# Patient Record
Sex: FEMALE | Race: WHITE | Employment: UNEMPLOYED | URBAN - METROPOLITAN AREA
[De-identification: names, ages, dates, MRNs, and addresses within clinical notes are randomized per-mention and may not be internally consistent; named-entity substitution may affect disease eponyms.]

---

## 2022-02-22 ENCOUNTER — HOSPITAL ENCOUNTER (EMERGENCY)
Age: 22
Discharge: HOME | End: 2022-02-22
Payer: COMMERCIAL

## 2022-02-22 ENCOUNTER — APPOINTMENT (OUTPATIENT)
Dept: GENERAL RADIOLOGY | Age: 22
End: 2022-02-22
Payer: COMMERCIAL

## 2022-02-22 VITALS
RESPIRATION RATE: 14 BRPM | BODY MASS INDEX: 17.58 KG/M2 | WEIGHT: 112 LBS | HEIGHT: 67 IN | OXYGEN SATURATION: 98 % | SYSTOLIC BLOOD PRESSURE: 103 MMHG | TEMPERATURE: 97.8 F | HEART RATE: 85 BPM | DIASTOLIC BLOOD PRESSURE: 69 MMHG

## 2022-02-22 DIAGNOSIS — S63.501A SPRAIN OF RIGHT WRIST, INITIAL ENCOUNTER: Primary | ICD-10-CM

## 2022-02-22 PROCEDURE — 99284 EMERGENCY DEPT VISIT MOD MDM: CPT

## 2022-02-22 PROCEDURE — 73110 X-RAY EXAM OF WRIST: CPT

## 2022-02-22 RX ORDER — NAPROXEN 500 MG/1
500 TABLET ORAL 2 TIMES DAILY
Qty: 20 TABLET | Refills: 0 | Status: SHIPPED | OUTPATIENT
Start: 2022-02-22 | End: 2022-03-04

## 2022-02-22 ASSESSMENT — PAIN DESCRIPTION - PAIN TYPE: TYPE: ACUTE PAIN

## 2022-02-22 ASSESSMENT — ENCOUNTER SYMPTOMS
COUGH: 0
SHORTNESS OF BREATH: 0
BACK PAIN: 0
ABDOMINAL PAIN: 0

## 2022-02-22 ASSESSMENT — PAIN DESCRIPTION - ORIENTATION: ORIENTATION: RIGHT

## 2022-02-22 ASSESSMENT — PAIN - FUNCTIONAL ASSESSMENT: PAIN_FUNCTIONAL_ASSESSMENT: 0-10

## 2022-02-22 ASSESSMENT — PAIN SCALES - GENERAL: PAINLEVEL_OUTOF10: 2

## 2022-02-22 ASSESSMENT — PAIN DESCRIPTION - LOCATION: LOCATION: WRIST

## 2022-02-22 NOTE — ED PROVIDER NOTES
[facility] [facility] ED  eMERGENCY dEPARTMENT eNCOUnter      Pt Name: Verna Ball  MRN: [id number]  [place] 2000  Date of evaluation: 2/22/2022  Provider: RADHA Wolff CNP      HISTORY OF PRESENT ILLNESS    Verna Ball is a 24 y.o. female who presents to the Emergency Department with R wrist pain after slipping in the bathroom and hitting her wrist yesterday. Pain is mild. She denies any other injury. Pain is worse with movement. REVIEW OF SYSTEMS       Review of Systems   Constitutional: Negative for fever. HENT: Negative for congestion. Respiratory: Negative for cough and shortness of breath. Cardiovascular: Negative for chest pain. Gastrointestinal: Negative for abdominal pain. Genitourinary: Negative for dysuria. Musculoskeletal: Negative for arthralgias and back pain. R wrist pain   Skin: Negative for rash. All other systems reviewed and are negative. PAST MEDICAL HISTORY   History reviewed. No pertinent past medical history. SURGICAL HISTORY       Past Surgical History:   Procedure Laterality Date    KNEE SURGERY Left          CURRENT MEDICATIONS       Previous Medications    No medications on file       ALLERGIES     Patient has no known allergies. FAMILY HISTORY     History reviewed. No pertinent family history.        SOCIAL HISTORY       Social History     Socioeconomic History    Marital status: Single     Spouse name: None    Number of children: None    Years of education: None    Highest education level: None   Occupational History    None   Tobacco Use    Smoking status: Never Smoker    Smokeless tobacco: Never Used   Vaping Use    Vaping Use: Never used   Substance and Sexual Activity    Alcohol use: Not Currently    Drug use: Yes     Types: Marijuana Deboraha Sanefren)     Comment: medical pt    Sexual activity: None   Other Topics Concern    None   Social History Narrative    None     Social Determinants of Health     Financial Resource Strain:     Difficulty of Paying Living Expenses: Not on file   Food Insecurity:     Worried About Running Out of Food in the Last Year: Not on file    Neda of Food in the Last Year: Not on file   Transportation Needs:     Lack of Transportation (Medical): Not on file    Lack of Transportation (Non-Medical): Not on file   Physical Activity:     Days of Exercise per Week: Not on file    Minutes of Exercise per Session: Not on file   Stress:     Feeling of Stress : Not on file   Social Connections:     Frequency of Communication with Friends and Family: Not on file    Frequency of Social Gatherings with Friends and Family: Not on file    Attends Jain Services: Not on file    Active Member of [address] [place] [address] [employer] or Organizations: Not on file    Attends Club or Organization Meetings: Not on file    Marital Status: Not on file   Intimate Partner Violence:     Fear of Current or Ex-Partner: Not on file    Emotionally Abused: Not on file    Physically Abused: Not on file    Sexually Abused: Not on file   Housing Stability:     Unable to Pay for Housing in the Last Year: Not on file    Number of [place] in the Last Year: Not on file    Unstable Housing in the Last Year: Not on file       SCREENINGS    [place] Coma Scale  Eye Opening: Spontaneous  Best Verbal Response: Oriented  Best Motor Response: Obeys commands  [place] Coma Scale Score: 15 @FLOW([id number])@      PHYSICAL EXAM    (up to 7 for level 4, 8 or more for level 5)     ED Triage Vitals [02/22/22 1442]   BP Temp Temp src Pulse Resp SpO2 Height Weight   103/69 97.8 °F (36.6 °C) -- 85 14 98 % 5' 7\" (1.702 m) 112 lb (50.8 kg)       Physical Exam  Musculoskeletal:      Right wrist: Tenderness (increased pain with supination) present. No swelling, deformity or crepitus. Normal pulse. Hands: All other labs were within normal range or not returned as of this dictation.     EMERGENCY DEPARTMENT COURSE and DIFFERENTIALDIAGNOSIS/MDM:   Vitals:    Vitals:    02/22/22 1442   BP: 103/69   Pulse: 85   Resp: 14   Temp: 97.8 °F (36.6 °C)   SpO2: 98%   Weight: 112 lb (50.8 kg)   Height: 5' 7\" (1.702 m)            24 yr old female with R wrist sprain. Velcro wrist splint applied. F/U with ortho in 1 week if symptoms persist.  Patient was given a prescription for Naprosyn. Patient verbalizes understanding       PROCEDURES:  Unless otherwise noted below, none     Procedures      FINAL IMPRESSION      1.  Sprain of right wrist, initial encounter          DISPOSITION/PLAN   DISPOSITION Decision To Discharge 02/22/2022 03:33:41 PM          RADHA Cabrera CNP (electronically signed)  Attending Emergency Physician     RADHA Cabrera CNP  02/22/22 1533

## 2022-02-22 NOTE — ED TRIAGE NOTES
Pt to ED with c/o right lateral wrist pain after tripping and falling yesterday. Skin warm, dry, intact. MSP intact. Increased pain with wrist movement.

## 2023-06-03 LAB
CLUE CELLS: ABNORMAL
NUGENT SCORE: 4
VAGINITIS-BV + YEAST INTERPRETATION: ABNORMAL
YEAST: ABNORMAL

## 2023-06-05 LAB — URINE CULTURE: ABNORMAL

## 2024-03-21 ENCOUNTER — HOSPITAL ENCOUNTER (EMERGENCY)
Facility: HOSPITAL | Age: 24
Discharge: HOME | End: 2024-03-21
Payer: COMMERCIAL

## 2024-03-21 VITALS
DIASTOLIC BLOOD PRESSURE: 65 MMHG | TEMPERATURE: 98.8 F | SYSTOLIC BLOOD PRESSURE: 110 MMHG | BODY MASS INDEX: 17.42 KG/M2 | HEIGHT: 67 IN | RESPIRATION RATE: 18 BRPM | HEART RATE: 72 BPM | WEIGHT: 111 LBS | OXYGEN SATURATION: 100 %

## 2024-03-21 DIAGNOSIS — K13.79 ORAL PAIN: Primary | ICD-10-CM

## 2024-03-21 PROCEDURE — 99283 EMERGENCY DEPT VISIT LOW MDM: CPT | Performed by: PHYSICIAN ASSISTANT

## 2024-03-21 PROCEDURE — 99283 EMERGENCY DEPT VISIT LOW MDM: CPT

## 2024-03-21 RX ORDER — AMOXICILLIN AND CLAVULANATE POTASSIUM 875; 125 MG/1; MG/1
875 TABLET, FILM COATED ORAL EVERY 12 HOURS
Qty: 20 TABLET | Refills: 0 | Status: SHIPPED | OUTPATIENT
Start: 2024-03-21 | End: 2024-03-31

## 2024-03-21 ASSESSMENT — LIFESTYLE VARIABLES
EVER FELT BAD OR GUILTY ABOUT YOUR DRINKING: NO
EVER HAD A DRINK FIRST THING IN THE MORNING TO STEADY YOUR NERVES TO GET RID OF A HANGOVER: NO
HAVE YOU EVER FELT YOU SHOULD CUT DOWN ON YOUR DRINKING: NO
HAVE PEOPLE ANNOYED YOU BY CRITICIZING YOUR DRINKING: NO

## 2024-03-21 ASSESSMENT — PAIN - FUNCTIONAL ASSESSMENT: PAIN_FUNCTIONAL_ASSESSMENT: 0-10

## 2024-03-21 ASSESSMENT — PAIN SCALES - GENERAL: PAINLEVEL_OUTOF10: 6

## 2024-03-21 ASSESSMENT — COLUMBIA-SUICIDE SEVERITY RATING SCALE - C-SSRS
1. IN THE PAST MONTH, HAVE YOU WISHED YOU WERE DEAD OR WISHED YOU COULD GO TO SLEEP AND NOT WAKE UP?: NO
2. HAVE YOU ACTUALLY HAD ANY THOUGHTS OF KILLING YOURSELF?: NO
6. HAVE YOU EVER DONE ANYTHING, STARTED TO DO ANYTHING, OR PREPARED TO DO ANYTHING TO END YOUR LIFE?: NO

## 2024-03-21 NOTE — ED PROVIDER NOTES
HPI   Chief Complaint   Patient presents with    Dental Pain       This is a 23-year-old otherwise healthy female presenting for evaluation of right posterior buccal mucosa pain.  She thought maybe she bit her mouth while she was sleeping.  Has had symptoms since waking yesterday morning.  Pain is worse with chewing and swallowing on the right side.  Has not had her wisdom teeth removed.  Denies difficulty swallowing, difficulty breathing, wheezing, fevers, chills, cough, chest pain, shortness of breath, abdominal, nausea, vomiting, diarrhea.  Is currently breast-feeding.      History provided by:  Patient   used: No                        [place] Coma Scale Score: 15                     Patient History   Past Medical History:   Diagnosis Date    Encounter for supervision of normal pregnancy, unspecified, unspecified trimester 06/07/2022    Pregnancy    Other specified health status 11/04/2020    No pertinent past medical history     Past Surgical History:   Procedure Laterality Date    OTHER SURGICAL HISTORY  10/30/2019    Knee arthroscopy     No family history on file.  Social History     Tobacco Use    Smoking status: Not on file    Smokeless tobacco: Not on file   Substance Use Topics    Alcohol use: Not on file    Drug use: Not on file       Physical Exam   ED Triage Vitals [03/21/24 0917]   Temperature Heart Rate Respirations BP   36.9 °C (98.4 °F) (!) 104 18 125/90      Pulse Ox Temp Source Heart Rate Source Patient Position   100 % Temporal Monitor Sitting      BP Location FiO2 (%)     Right arm --       Physical Exam    Gen.: Vitals noted no distress afebrile. Normal phonation, no stridor, no trismus.  ENT: Good dentition. Floor of the mouth is soft. No obvious abscess.  There appears to be some swelling erythema and a small 0.2 to 0.3 cm flat macular white spot on the posterior right buccal mucosa.  This is soft and not removable.  Posterior oropharynx patent, noninjected, no exudate.  Uvula midline.  Neck: No lymphadenopathy.   Cardiac: Regular rate and rhythm. No murmur.  Lungs: Good aeration throughout. No adventitious breath sounds.     ED Course & MDM   Diagnoses as of 03/21/24 1031   Oral pain       Medical Decision Making  Patient is stable hemodynamically.  Physical exam findings as above.  Visibly nontoxic-appearing no apparent distress.  Patient will be covered for infectious process with Augmentin.  Advised that this is safe to take while breast-feeding.  Follow-up with her dentist.  Instructed to return to the nearest ED if any concerns or new or worsening symptoms. Patient verbalized understanding and agreement with plan. Discharged in stable condition.      Disclaimer: This note was dictated using speech recognition software. An attempt at proofreading was made to minimize errors. Minor errors in transcription may be present. Please call if questions.        Procedure  Procedures     Yosvany Woods PA-C  03/21/24 9516